# Patient Record
Sex: FEMALE | Race: ASIAN | NOT HISPANIC OR LATINO | URBAN - METROPOLITAN AREA
[De-identification: names, ages, dates, MRNs, and addresses within clinical notes are randomized per-mention and may not be internally consistent; named-entity substitution may affect disease eponyms.]

---

## 2024-04-12 ENCOUNTER — INPATIENT (INPATIENT)
Facility: HOSPITAL | Age: 69
LOS: 0 days | Discharge: HOME | DRG: 149 | End: 2024-04-13
Attending: STUDENT IN AN ORGANIZED HEALTH CARE EDUCATION/TRAINING PROGRAM | Admitting: STUDENT IN AN ORGANIZED HEALTH CARE EDUCATION/TRAINING PROGRAM
Payer: MEDICARE

## 2024-04-12 VITALS
RESPIRATION RATE: 20 BRPM | TEMPERATURE: 98 F | SYSTOLIC BLOOD PRESSURE: 152 MMHG | DIASTOLIC BLOOD PRESSURE: 82 MMHG | HEART RATE: 95 BPM | OXYGEN SATURATION: 96 %

## 2024-04-12 LAB
ALBUMIN SERPL ELPH-MCNC: 4.3 G/DL — SIGNIFICANT CHANGE UP (ref 3.3–5)
ALP SERPL-CCNC: 81 U/L — SIGNIFICANT CHANGE UP (ref 40–120)
ALT FLD-CCNC: 12 U/L — SIGNIFICANT CHANGE UP (ref 10–45)
ANION GAP SERPL CALC-SCNC: 14 MMOL/L — SIGNIFICANT CHANGE UP (ref 5–17)
AST SERPL-CCNC: 20 U/L — SIGNIFICANT CHANGE UP (ref 10–40)
B-OH-BUTYR SERPL-SCNC: 0.1 MMOL/L — SIGNIFICANT CHANGE UP
BASE EXCESS BLDV CALC-SCNC: 0.5 MMOL/L — SIGNIFICANT CHANGE UP (ref -2–3)
BASOPHILS # BLD AUTO: 0.03 K/UL — SIGNIFICANT CHANGE UP (ref 0–0.2)
BASOPHILS NFR BLD AUTO: 0.5 % — SIGNIFICANT CHANGE UP (ref 0–2)
BILIRUB SERPL-MCNC: 0.4 MG/DL — SIGNIFICANT CHANGE UP (ref 0.2–1.2)
BUN SERPL-MCNC: 27 MG/DL — HIGH (ref 7–23)
CA-I SERPL-SCNC: 1.2 MMOL/L — SIGNIFICANT CHANGE UP (ref 1.15–1.33)
CALCIUM SERPL-MCNC: 9.5 MG/DL — SIGNIFICANT CHANGE UP (ref 8.4–10.5)
CHLORIDE BLDV-SCNC: 102 MMOL/L — SIGNIFICANT CHANGE UP (ref 96–108)
CHLORIDE SERPL-SCNC: 102 MMOL/L — SIGNIFICANT CHANGE UP (ref 96–108)
CO2 BLDV-SCNC: 27 MMOL/L — HIGH (ref 22–26)
CO2 SERPL-SCNC: 22 MMOL/L — SIGNIFICANT CHANGE UP (ref 22–31)
CREAT SERPL-MCNC: 0.66 MG/DL — SIGNIFICANT CHANGE UP (ref 0.5–1.3)
EGFR: 95 ML/MIN/1.73M2 — SIGNIFICANT CHANGE UP
EOSINOPHIL # BLD AUTO: 0.06 K/UL — SIGNIFICANT CHANGE UP (ref 0–0.5)
EOSINOPHIL NFR BLD AUTO: 1 % — SIGNIFICANT CHANGE UP (ref 0–6)
GAS PNL BLDV: 135 MMOL/L — LOW (ref 136–145)
GAS PNL BLDV: SIGNIFICANT CHANGE UP
GLUCOSE BLDV-MCNC: 241 MG/DL — HIGH (ref 70–99)
GLUCOSE SERPL-MCNC: 244 MG/DL — HIGH (ref 70–99)
HCO3 BLDV-SCNC: 26 MMOL/L — SIGNIFICANT CHANGE UP (ref 22–29)
HCT VFR BLD CALC: 35.9 % — SIGNIFICANT CHANGE UP (ref 34.5–45)
HCT VFR BLDA CALC: 39 % — SIGNIFICANT CHANGE UP (ref 34.5–46.5)
HGB BLD CALC-MCNC: 12 G/DL — SIGNIFICANT CHANGE UP (ref 11.7–16.1)
HGB BLD-MCNC: 12.1 G/DL — SIGNIFICANT CHANGE UP (ref 11.5–15.5)
IMM GRANULOCYTES NFR BLD AUTO: 0.8 % — SIGNIFICANT CHANGE UP (ref 0–0.9)
LACTATE BLDV-MCNC: 2.3 MMOL/L — HIGH (ref 0.5–2)
LYMPHOCYTES # BLD AUTO: 1.45 K/UL — SIGNIFICANT CHANGE UP (ref 1–3.3)
LYMPHOCYTES # BLD AUTO: 23 % — SIGNIFICANT CHANGE UP (ref 13–44)
MAGNESIUM SERPL-MCNC: 1.7 MG/DL — SIGNIFICANT CHANGE UP (ref 1.6–2.6)
MCHC RBC-ENTMCNC: 31.5 PG — SIGNIFICANT CHANGE UP (ref 27–34)
MCHC RBC-ENTMCNC: 33.7 GM/DL — SIGNIFICANT CHANGE UP (ref 32–36)
MCV RBC AUTO: 93.5 FL — SIGNIFICANT CHANGE UP (ref 80–100)
MONOCYTES # BLD AUTO: 0.49 K/UL — SIGNIFICANT CHANGE UP (ref 0–0.9)
MONOCYTES NFR BLD AUTO: 7.8 % — SIGNIFICANT CHANGE UP (ref 2–14)
NEUTROPHILS # BLD AUTO: 4.22 K/UL — SIGNIFICANT CHANGE UP (ref 1.8–7.4)
NEUTROPHILS NFR BLD AUTO: 66.9 % — SIGNIFICANT CHANGE UP (ref 43–77)
NRBC # BLD: 0 /100 WBCS — SIGNIFICANT CHANGE UP (ref 0–0)
OTHER CELLS CSF MANUAL: 14.2 ML/DL — LOW (ref 18–22)
PCO2 BLDV: 44 MMHG — HIGH (ref 39–42)
PH BLDV: 7.38 — SIGNIFICANT CHANGE UP (ref 7.32–7.43)
PHOSPHATE SERPL-MCNC: 2.3 MG/DL — LOW (ref 2.5–4.5)
PLATELET # BLD AUTO: 220 K/UL — SIGNIFICANT CHANGE UP (ref 150–400)
PO2 BLDV: 52 MMHG — HIGH (ref 25–45)
POTASSIUM BLDV-SCNC: 4.1 MMOL/L — SIGNIFICANT CHANGE UP (ref 3.5–5.1)
POTASSIUM SERPL-MCNC: 3.9 MMOL/L — SIGNIFICANT CHANGE UP (ref 3.5–5.3)
POTASSIUM SERPL-SCNC: 3.9 MMOL/L — SIGNIFICANT CHANGE UP (ref 3.5–5.3)
PROT SERPL-MCNC: 7.3 G/DL — SIGNIFICANT CHANGE UP (ref 6–8.3)
RBC # BLD: 3.84 M/UL — SIGNIFICANT CHANGE UP (ref 3.8–5.2)
RBC # FLD: 13.5 % — SIGNIFICANT CHANGE UP (ref 10.3–14.5)
SAO2 % BLDV: 86 % — SIGNIFICANT CHANGE UP (ref 67–88)
SODIUM SERPL-SCNC: 138 MMOL/L — SIGNIFICANT CHANGE UP (ref 135–145)
WBC # BLD: 6.3 K/UL — SIGNIFICANT CHANGE UP (ref 3.8–10.5)
WBC # FLD AUTO: 6.3 K/UL — SIGNIFICANT CHANGE UP (ref 3.8–10.5)

## 2024-04-12 PROCEDURE — 70498 CT ANGIOGRAPHY NECK: CPT | Mod: 26,MC

## 2024-04-12 PROCEDURE — 99285 EMERGENCY DEPT VISIT HI MDM: CPT

## 2024-04-12 PROCEDURE — 70496 CT ANGIOGRAPHY HEAD: CPT | Mod: 26,MC

## 2024-04-12 RX ORDER — MECLIZINE HCL 12.5 MG
25 TABLET ORAL ONCE
Refills: 0 | Status: COMPLETED | OUTPATIENT
Start: 2024-04-12 | End: 2024-04-12

## 2024-04-12 RX ORDER — SODIUM CHLORIDE 9 MG/ML
1000 INJECTION, SOLUTION INTRAVENOUS ONCE
Refills: 0 | Status: COMPLETED | OUTPATIENT
Start: 2024-04-12 | End: 2024-04-12

## 2024-04-12 RX ADMIN — SODIUM CHLORIDE 1000 MILLILITER(S): 9 INJECTION, SOLUTION INTRAVENOUS at 23:41

## 2024-04-12 RX ADMIN — Medication 25 MILLIGRAM(S): at 23:41

## 2024-04-12 NOTE — ED PROVIDER NOTE - ATTENDING CONTRIBUTION TO CARE
Private Physician Zachery Crain pcp  68y f PMH DMT2 on metformin. HTN, HLD, PT comes to ed c/o feeling weak/faint at 645p w room spinning. Nausea w/o vomiting. worse w movement. No prior hx similar event. Had uri last week w cough and runny nose. Private Physician Zachery Crain flucorie pcp  68y f PMH DMT2 on metformin. HTN, HLD, PT comes to ed c/o feeling weak/faint at 645p w room spinning. Nausea w/o vomiting. worse w movement. No prior hx similar event. Had uri last week w cough and runny nose. PE WDWN female awake alert normocephalic atraumatic neck supple chest clear anterior & posterior chest clear anterior & posterior cv no rubs, gallops or murmurs abd soft +bs no mass guarding rebound. neruo gcs 15 speech fluent power 5/5 all ext, cn2-12 intact,   Sterling Davis MD, Facep

## 2024-04-12 NOTE — ED PROVIDER NOTE - NS ED ROS FT
Constitutional/General: ( ) Acute distress   Eyes: ( ) Changes in vision, ( ) double vision, ( ) blurry vision  ENT: ( ) Nasal congestion or rhinorrhea, ( ) changes in hearing, ( ) odynophagia or dysphagia   Skin: ( ) Rashes  Cardiovascular: ( ) Chest pain, ( ) heart palpitations, ( ) orthopnea/PND  Pulmonary: ( ) Shortness of breath, ( ) cough, ( ) pleuritic chest pain, ( ) hemoptysis   Gastrointestinal: (YES) Nausea, ( ) vomiting, ( ) diarrhea, ( ) bloating, ( ) constipation, ( ) abdominal pain  Genitourinary: ( ) Dysuria, ( ) frequency, ( ) change in urine odor/appearance   Musculoskeletal: ( ) Changes in strength, ( ) joint tenderness or swelling  Neurologic: ( ) Changes in memory, ( ) headache, ( ) weakness, ( ) paresthesias, ( ) imbalance, (YES) dizziness   Endocrine: ( ) Heat/cold intolerance, ( ) weight change, ( ) excessive sweating, ( ) polydipsia/polyuria  Psychology: ( ) Changes in mood, ( ) anxiety, ( ) depression.  Heme/Lymph: ( ) Easy bruising    ROS otherwise negative unless mentioned in HPI

## 2024-04-12 NOTE — ED PROVIDER NOTE - PHYSICAL EXAMINATION
CONSTITUTIONAL: Somnolent  HEENT: Moist oral mucosa  RESPIRATORY: Normal respiratory effort, lungs are clear to auscultation bilaterally  CARDIOVASCULAR: Tachycardic rate and regular rhythm, normal S1 and S2, no murmur/rub/gallop, no lower extremity edema, peripheral pulses are palpable bilaterally  ABDOMEN: Soft, nondistended, nontender to palpation, normoactive bowel sounds, no rebound/guarding  PSYCH: A+O to person, place, and time. Opens eyes to voice  NEUROLOGY: Facial expression symmetric, no gross sensory deficits appreciated, moves all extremities spontaneously. Patient slow to respond to commands and does them slowly. No nystagmus able to be endorsed. Dizziness worsened with head movements  SKIN: No rashes, no palpable lesions

## 2024-04-12 NOTE — ED PROVIDER NOTE - CLINICAL SUMMARY MEDICAL DECISION MAKING FREE TEXT BOX
68 year old Cantonese speaking woman presenting to the ED with dizziness and lethargy since 6PM. Acute onset of dizziness and lethargy per this evening per daughter who lives with her. Otherwise normal this morning. Likely viral URI last week, could possibly have caused labyrinthitis leading to vertigo given room spinning endorsement. Patient overall somnolent, slow to respond and follow commands. Neuro exam overall intact and non-focal. Could be euglycemic DKA as well. Basic labs, BHB, VBG with lactate, fluids, CTA head and neck for evaluation. 68 year old Cantonese speaking woman presenting to the ED with dizziness and lethargy since 6PM. Acute onset of dizziness and lethargy per this evening per daughter who lives with her. Otherwise normal this morning. Likely viral URI last week, could possibly have caused labyrinthitis leading to vertigo given room spinning endorsement, also possible for BPPV. Patient overall somnolent, slow to respond and follow commands. Neuro exam overall intact and otherwise non-focal. Could be euglycemic DKA as well. Basic labs, BHB, VBG with lactate, fluids, CTA head and neck for evaluation. 68 year old Cantonese speaking woman presenting to the ED with dizziness and lethargy since 6PM. Acute onset of dizziness and lethargy per this evening per daughter who lives with her. Otherwise normal this morning. Likely viral URI last week, could possibly have caused labyrinthitis leading to vertigo given room spinning endorsement, also possible for BPPV. Patient overall somnolent, slow to respond and follow commands. Neuro exam overall intact and otherwise non-focal. Could be euglycemic DKA as well. Basic labs, BHB, VBG with lactate, fluids, blood and urine cultures, CTA head and neck for evaluation. 68 year old Cantonese speaking woman presenting to the ED with dizziness and lethargy since 6PM. Acute onset of dizziness and lethargy per this evening per daughter who lives with her. Otherwise normal this morning. Likely viral URI last week, could possibly have caused labyrinthitis leading to vertigo given room spinning endorsement, also possible for BPPV. Patient overall somnolent, slow to respond and follow commands. Neuro exam overall intact and otherwise non-focal. Could be euglycemic DKA as well. Afebrile, not hypotensive, doesn't appear to be septic presentation. Basic labs, BHB, VBG with lactate, fluids, blood and urine cultures, CTA head and neck for evaluation.

## 2024-04-12 NOTE — ED PROVIDER NOTE - PROGRESS NOTE DETAILS
Endorsed to Dr ROXANNE Davis MD, Facep receive s/o pt w/ dizziness and lethargy was on 2L NC, to maintain ox, attempted to wean off however while at rest sat to 90 percent while in bed, xr w/ L sided infiltrate as a result, will obtain ct scan, additionally, pt UA w/ possible UTI will cover w/ rocephin, pt to be admitted for hypoxia

## 2024-04-12 NOTE — ED PROVIDER NOTE - OBJECTIVE STATEMENT
68 year old Cantonese speaking woman presenting to the ED with dizziness and lethargy since 6PM. Per daughter at bedside who provides translation and collateral endorses that her mother was fine this morning and afternoon. Patient lives with daughter and daughter left the house at 3PM today. Patient then called daughter at 6PM saying 68 year old Cantonese speaking woman presenting to the ED with dizziness and lethargy since 6PM. Per daughter at bedside who provides translation and collateral endorses that her mother was fine this morning and afternoon. Patient lives with daughter and daughter left the house at 3PM today. Patient then called daughter at 6PM saying she was dizzy and felt weak. Daughter endorses that last week her mom had cough, congestion last week. Patient hasn't had symptoms like this before per daughter and patient. No chest pain, no shortness of breath. Endorsed nausea but no vomiting. No lower extremity swelling. Endorses room spinning sensation.

## 2024-04-13 VITALS
OXYGEN SATURATION: 96 % | RESPIRATION RATE: 18 BRPM | SYSTOLIC BLOOD PRESSURE: 126 MMHG | TEMPERATURE: 98 F | DIASTOLIC BLOOD PRESSURE: 71 MMHG | HEART RATE: 70 BPM

## 2024-04-13 DIAGNOSIS — Z98.891 HISTORY OF UTERINE SCAR FROM PREVIOUS SURGERY: Chronic | ICD-10-CM

## 2024-04-13 DIAGNOSIS — E11.9 TYPE 2 DIABETES MELLITUS WITHOUT COMPLICATIONS: ICD-10-CM

## 2024-04-13 DIAGNOSIS — J98.11 ATELECTASIS: ICD-10-CM

## 2024-04-13 DIAGNOSIS — I10 ESSENTIAL (PRIMARY) HYPERTENSION: ICD-10-CM

## 2024-04-13 DIAGNOSIS — R42 DIZZINESS AND GIDDINESS: ICD-10-CM

## 2024-04-13 LAB
ADD ON TEST-SPECIMEN IN LAB: SIGNIFICANT CHANGE UP
APPEARANCE UR: ABNORMAL
APTT BLD: 28.2 SEC — SIGNIFICANT CHANGE UP (ref 24.5–35.6)
BACTERIA # UR AUTO: ABNORMAL /HPF
BASE EXCESS BLDV CALC-SCNC: 1.5 MMOL/L — SIGNIFICANT CHANGE UP (ref -2–3)
BILIRUB UR-MCNC: NEGATIVE — SIGNIFICANT CHANGE UP
CA-I SERPL-SCNC: 1.21 MMOL/L — SIGNIFICANT CHANGE UP (ref 1.15–1.33)
CAST: 0 /LPF — SIGNIFICANT CHANGE UP (ref 0–4)
CHLORIDE BLDV-SCNC: 104 MMOL/L — SIGNIFICANT CHANGE UP (ref 96–108)
CO2 BLDV-SCNC: 27 MMOL/L — HIGH (ref 22–26)
COD CRY URNS QL: PRESENT
COLOR SPEC: YELLOW — SIGNIFICANT CHANGE UP
D DIMER BLD IA.RAPID-MCNC: <150 NG/ML DDU — SIGNIFICANT CHANGE UP
DIFF PNL FLD: NEGATIVE — SIGNIFICANT CHANGE UP
FLUAV AG NPH QL: SIGNIFICANT CHANGE UP
FLUBV AG NPH QL: SIGNIFICANT CHANGE UP
GAS PNL BLDV: 136 MMOL/L — SIGNIFICANT CHANGE UP (ref 136–145)
GAS PNL BLDV: SIGNIFICANT CHANGE UP
GLUCOSE BLDV-MCNC: 163 MG/DL — HIGH (ref 70–99)
GLUCOSE UR QL: >=1000 MG/DL
HCO3 BLDV-SCNC: 26 MMOL/L — SIGNIFICANT CHANGE UP (ref 22–29)
HCT VFR BLDA CALC: 35 % — SIGNIFICANT CHANGE UP (ref 34.5–46.5)
HGB BLD CALC-MCNC: 11.5 G/DL — LOW (ref 11.7–16.1)
INR BLD: 1.03 RATIO — SIGNIFICANT CHANGE UP (ref 0.85–1.18)
KETONES UR-MCNC: NEGATIVE MG/DL — SIGNIFICANT CHANGE UP
LACTATE BLDV-MCNC: 1.4 MMOL/L — SIGNIFICANT CHANGE UP (ref 0.5–2)
LEUKOCYTE ESTERASE UR-ACNC: ABNORMAL
NITRITE UR-MCNC: NEGATIVE — SIGNIFICANT CHANGE UP
PCO2 BLDV: 39 MMHG — SIGNIFICANT CHANGE UP (ref 39–42)
PH BLDV: 7.43 — SIGNIFICANT CHANGE UP (ref 7.32–7.43)
PH UR: 6 — SIGNIFICANT CHANGE UP (ref 5–8)
PO2 BLDV: 62 MMHG — HIGH (ref 25–45)
POTASSIUM BLDV-SCNC: 3.4 MMOL/L — LOW (ref 3.5–5.1)
PROT UR-MCNC: NEGATIVE MG/DL — SIGNIFICANT CHANGE UP
PROTHROM AB SERPL-ACNC: 10.8 SEC — SIGNIFICANT CHANGE UP (ref 9.5–13)
RBC CASTS # UR COMP ASSIST: 1 /HPF — SIGNIFICANT CHANGE UP (ref 0–4)
REVIEW: SIGNIFICANT CHANGE UP
RSV RNA NPH QL NAA+NON-PROBE: SIGNIFICANT CHANGE UP
SAO2 % BLDV: 93.9 % — HIGH (ref 67–88)
SARS-COV-2 RNA SPEC QL NAA+PROBE: SIGNIFICANT CHANGE UP
SP GR SPEC: >1.03 — HIGH (ref 1–1.03)
SQUAMOUS # UR AUTO: 8 /HPF — HIGH (ref 0–5)
TROPONIN T, HIGH SENSITIVITY RESULT: <6 NG/L — SIGNIFICANT CHANGE UP (ref 0–51)
UROBILINOGEN FLD QL: 0.2 MG/DL — SIGNIFICANT CHANGE UP (ref 0.2–1)
WBC UR QL: 76 /HPF — HIGH (ref 0–5)

## 2024-04-13 PROCEDURE — 82803 BLOOD GASES ANY COMBINATION: CPT

## 2024-04-13 PROCEDURE — 87637 SARSCOV2&INF A&B&RSV AMP PRB: CPT

## 2024-04-13 PROCEDURE — 99223 1ST HOSP IP/OBS HIGH 75: CPT

## 2024-04-13 PROCEDURE — 87040 BLOOD CULTURE FOR BACTERIA: CPT

## 2024-04-13 PROCEDURE — 71250 CT THORAX DX C-: CPT | Mod: 26,MC

## 2024-04-13 PROCEDURE — 80053 COMPREHEN METABOLIC PANEL: CPT

## 2024-04-13 PROCEDURE — 84295 ASSAY OF SERUM SODIUM: CPT

## 2024-04-13 PROCEDURE — 71045 X-RAY EXAM CHEST 1 VIEW: CPT

## 2024-04-13 PROCEDURE — 81001 URINALYSIS AUTO W/SCOPE: CPT

## 2024-04-13 PROCEDURE — 71045 X-RAY EXAM CHEST 1 VIEW: CPT | Mod: 26

## 2024-04-13 PROCEDURE — 70496 CT ANGIOGRAPHY HEAD: CPT | Mod: MC

## 2024-04-13 PROCEDURE — 85014 HEMATOCRIT: CPT

## 2024-04-13 PROCEDURE — 96374 THER/PROPH/DIAG INJ IV PUSH: CPT

## 2024-04-13 PROCEDURE — 82010 KETONE BODYS QUAN: CPT

## 2024-04-13 PROCEDURE — 84484 ASSAY OF TROPONIN QUANT: CPT

## 2024-04-13 PROCEDURE — 71250 CT THORAX DX C-: CPT | Mod: MC

## 2024-04-13 PROCEDURE — 83605 ASSAY OF LACTIC ACID: CPT

## 2024-04-13 PROCEDURE — 85610 PROTHROMBIN TIME: CPT

## 2024-04-13 PROCEDURE — 83880 ASSAY OF NATRIURETIC PEPTIDE: CPT

## 2024-04-13 PROCEDURE — 85025 COMPLETE CBC W/AUTO DIFF WBC: CPT

## 2024-04-13 PROCEDURE — 87086 URINE CULTURE/COLONY COUNT: CPT

## 2024-04-13 PROCEDURE — 84132 ASSAY OF SERUM POTASSIUM: CPT

## 2024-04-13 PROCEDURE — 83735 ASSAY OF MAGNESIUM: CPT

## 2024-04-13 PROCEDURE — 70498 CT ANGIOGRAPHY NECK: CPT | Mod: MC

## 2024-04-13 PROCEDURE — 85018 HEMOGLOBIN: CPT

## 2024-04-13 PROCEDURE — 85379 FIBRIN DEGRADATION QUANT: CPT

## 2024-04-13 PROCEDURE — 82330 ASSAY OF CALCIUM: CPT

## 2024-04-13 PROCEDURE — 99285 EMERGENCY DEPT VISIT HI MDM: CPT

## 2024-04-13 PROCEDURE — 82947 ASSAY GLUCOSE BLOOD QUANT: CPT

## 2024-04-13 PROCEDURE — 85730 THROMBOPLASTIN TIME PARTIAL: CPT

## 2024-04-13 PROCEDURE — 84100 ASSAY OF PHOSPHORUS: CPT

## 2024-04-13 PROCEDURE — 82435 ASSAY OF BLOOD CHLORIDE: CPT

## 2024-04-13 RX ORDER — PIOGLITAZONE HYDROCHLORIDE 15 MG/1
1 TABLET ORAL
Refills: 0 | DISCHARGE

## 2024-04-13 RX ORDER — METFORMIN HYDROCHLORIDE 850 MG/1
1 TABLET ORAL
Refills: 0 | DISCHARGE

## 2024-04-13 RX ORDER — LOSARTAN POTASSIUM 100 MG/1
25 TABLET, FILM COATED ORAL DAILY
Refills: 0 | Status: DISCONTINUED | OUTPATIENT
Start: 2024-04-13 | End: 2024-04-13

## 2024-04-13 RX ORDER — FOLIC ACID 0.8 MG
1 TABLET ORAL DAILY
Refills: 0 | Status: DISCONTINUED | OUTPATIENT
Start: 2024-04-13 | End: 2024-04-13

## 2024-04-13 RX ORDER — CHOLECALCIFEROL (VITAMIN D3) 125 MCG
1000 CAPSULE ORAL DAILY
Refills: 0 | Status: DISCONTINUED | OUTPATIENT
Start: 2024-04-13 | End: 2024-04-13

## 2024-04-13 RX ORDER — LANOLIN ALCOHOL/MO/W.PET/CERES
3 CREAM (GRAM) TOPICAL AT BEDTIME
Refills: 0 | Status: DISCONTINUED | OUTPATIENT
Start: 2024-04-13 | End: 2024-04-13

## 2024-04-13 RX ORDER — LOSARTAN POTASSIUM 100 MG/1
1 TABLET, FILM COATED ORAL
Refills: 0 | DISCHARGE

## 2024-04-13 RX ORDER — FOLIC ACID 0.8 MG
1 TABLET ORAL
Refills: 0 | DISCHARGE

## 2024-04-13 RX ORDER — CEFTRIAXONE 500 MG/1
1000 INJECTION, POWDER, FOR SOLUTION INTRAMUSCULAR; INTRAVENOUS ONCE
Refills: 0 | Status: COMPLETED | OUTPATIENT
Start: 2024-04-13 | End: 2024-04-13

## 2024-04-13 RX ORDER — ATORVASTATIN CALCIUM 80 MG/1
10 TABLET, FILM COATED ORAL AT BEDTIME
Refills: 0 | Status: DISCONTINUED | OUTPATIENT
Start: 2024-04-13 | End: 2024-04-13

## 2024-04-13 RX ORDER — CHOLECALCIFEROL (VITAMIN D3) 125 MCG
1 CAPSULE ORAL
Refills: 0 | DISCHARGE

## 2024-04-13 RX ORDER — ATORVASTATIN CALCIUM 80 MG/1
1 TABLET, FILM COATED ORAL
Refills: 0 | DISCHARGE

## 2024-04-13 RX ORDER — ACETAMINOPHEN 500 MG
650 TABLET ORAL EVERY 6 HOURS
Refills: 0 | Status: DISCONTINUED | OUTPATIENT
Start: 2024-04-13 | End: 2024-04-13

## 2024-04-13 RX ADMIN — Medication 1000 UNIT(S): at 11:38

## 2024-04-13 RX ADMIN — CEFTRIAXONE 100 MILLIGRAM(S): 500 INJECTION, POWDER, FOR SOLUTION INTRAMUSCULAR; INTRAVENOUS at 05:15

## 2024-04-13 RX ADMIN — Medication 1 MILLIGRAM(S): at 11:38

## 2024-04-13 RX ADMIN — LOSARTAN POTASSIUM 25 MILLIGRAM(S): 100 TABLET, FILM COATED ORAL at 11:38

## 2024-04-13 NOTE — DISCHARGE NOTE NURSING/CASE MANAGEMENT/SOCIAL WORK - NSDCPEFALRISK_GEN_ALL_CORE
For information on Fall & Injury Prevention, visit: [email or web address] OR  [email or web address] OR  https://www.cdc.gov/steadi/patient.html

## 2024-04-13 NOTE — H&P ADULT - NSHPPHYSICALEXAM_GEN_ALL_CORE
Vital Signs Last 24 Hrs  T(C): 36.4 (13 Apr 2024 08:00), Max: 36.9 (13 Apr 2024 05:55)  T(F): 97.6 (13 Apr 2024 08:00), Max: 98.4 (13 Apr 2024 05:55)  HR: 65 (13 Apr 2024 08:00) (59 - 95)  BP: 132/74 (13 Apr 2024 08:00) (115/65 - 152/82)  BP(mean): 75 (13 Apr 2024 05:55) (75 - 80)  RR: 18 (13 Apr 2024 08:00) (15 - 20)  SpO2: 97% (13 Apr 2024 08:00) (89% - 99%)    Parameters below as of 13 Apr 2024 08:00  Patient On (Oxygen Delivery Method): nasal cannula  O2 Flow (L/min): 2    93% on Room air w/ ambulation     GENERAL: NAD, well-developed  HEAD:  Atraumatic, Normocephalic  EYES: EOMI, PERRL, conjunctiva and sclera clear  NECK: Supple, No JVD  CHEST/LUNG: bibasilar crackles; otherwise clear to auscultation bilaterally; No wheeze  HEART: Regular rate and rhythm; No murmurs, rubs, or gallops  ABDOMEN: Soft, Nontender, Nondistended; Bowel sounds present  EXTREMITIES:  2+ Peripheral Pulses, No clubbing, cyanosis, or edema  PSYCH: AAOx3, appropriate affect  NEUROLOGY: non-focal, krishna  SKIN: No rashes or lesions

## 2024-04-13 NOTE — DISCHARGE NOTE PROVIDER - CARE PROVIDER_API CALL
Zachery Crain  Internal Medicine  [address] [place] [address]  Phone: [phone]  Fax: [phone]  Established Patient  Follow Up Time: 1 week

## 2024-04-13 NOTE — ED ADULT NURSE NOTE - OBJECTIVE STATEMENT
Pt is a 69yo F w/ PMH DM (Cantonese speaking , daughter translating at bedside) presenting to the ED with dizziness and lethargy since 6PM. Daughter states that her mother was fine this morning and afternoon. Patient lives with daughter and daughter left the house at 3PM today. Patient then called daughter at 6PM saying she was dizzy and felt weak. Daughter endorses that last week her mom had cough, congestion last week. Patient hasn't had symptoms like this before per daughter and patient. A&Ox4. Strong peripheral pulses. Neurologically intact and follows commands. Abdomen soft, nondistended, nontender to palpation. Skin warm dry intact and normal for ethnicity. Stretcher locked and in lowest position, appropriate side rails up. Pt instructed to notify RN if assistance is needed.

## 2024-04-13 NOTE — DISCHARGE NOTE PROVIDER - NSDCCPCAREPLAN_GEN_ALL_CORE_FT
PRINCIPAL DISCHARGE DIAGNOSIS  Diagnosis: Dizziness  Assessment and Plan of Treatment: You had an episode of dizziness which we did not identify a specific cause. This may be a residual effect of your recent viral illness. If this symptom recurs, you may follow-up with an ENT specialist.      SECONDARY DISCHARGE DIAGNOSES  Diagnosis: Atelectasis  Assessment and Plan of Treatment: You had borderline low oxygen levels. This is most likely the result of atelectasis, or incomplete expansion of your lungs. Take deep breaths and use your incentive spirometer to help open your airways. Regular exercise can also help improve your lung function.    Diagnosis: T2DM (type 2 diabetes mellitus)  Assessment and Plan of Treatment: Follow-up with your PCP for further diabetes management.    Diagnosis: Multinodular goiter  Assessment and Plan of Treatment: CT scan showed nodules in your thyroid. Follow-up with your PCP for a thyroid ultrasound.

## 2024-04-13 NOTE — DISCHARGE NOTE PROVIDER - HOSPITAL COURSE
HPI:  68F with hx of HTN, DM had sudden onset dizziness that started yesterday evening right before she was about to eat dinner. This has never happened to her before. Her dizziness caused her to not be able to eat dinner, and she called her daughter to take her to the emergency room. Daughter called EMS and she was transported to the hospital. Head movement made the symptoms worse. She arrived in the ED on 8L NRB, which was later de-escalated to 2L NC. At rest, her O2 dropped to 90%. Last week, pt had URI symptoms which are now resolved. Pt slept well in the ED. This AM, her symptoms are resolved and ambulated to the bathroom without any dizziness.     Initial vitals in the ED: /82, HR 95, RR 20, SaO2 96% on 8L NRB, T38.2. Pt received CTX 1g, LR 1L, meclizine 25 mg. ED treated for UTI. Pt is asymptomatic and does not have a UTI.  (13 Apr 2024 10:18)    Hospital Course: Pt discharged home.     Important Medication Changes and Reason:    Active or Pending Issues Requiring Follow-up: Follow-up with PCP for US of thyroid.     Advanced Directives:   [X] Full code  [ ] DNR  [ ] Hospice    Discharge Diagnoses:  Dizziness  Atelectasis  Multinodular goiter  HTN  DM

## 2024-04-13 NOTE — DISCHARGE NOTE NURSING/CASE MANAGEMENT/SOCIAL WORK - PATIENT PORTAL LINK FT
You can access the FollowMyHealth Patient Portal offered by [facility] by registering at the following website: [email or web address]. By joining [facility]’s FollowMyHealth portal, you will also be able to view your health information using other applications (apps) compatible with our system.

## 2024-04-13 NOTE — H&P ADULT - HISTORY OF PRESENT ILLNESS
68F with hx of HTN, DM had sudden onset dizziness that started yesterday evening right before she was about to eat dinner. This has never happened to her before. Her dizziness caused her to not be able to eat dinner, and she called her daughter to take her to the emergency room. Daughter called EMS and she was transported to the hospital. Head movement made the symptoms worse. She arrived in the ED on 8L NRB, which was later de-escalated to 2L NC. At rest, her O2 dropped to 90%. Last week, pt had URI symptoms which are now resolved. Pt slept well in the ED. This AM, her symptoms are resolved and ambulated to the bathroom without any dizziness.     Initial vitals in the ED: /82, HR 95, RR 20, SaO2 96% on 8L NRB, T38.2. Pt received CTX 1g, LR 1L, meclizine 25 mg.  68F with hx of HTN, DM had sudden onset dizziness that started yesterday evening right before she was about to eat dinner. This has never happened to her before. Her dizziness caused her to not be able to eat dinner, and she called her daughter to take her to the emergency room. Daughter called EMS and she was transported to the hospital. Head movement made the symptoms worse. She arrived in the ED on 8L NRB, which was later de-escalated to 2L NC. At rest, her O2 dropped to 90%. Last week, pt had URI symptoms which are now resolved. Pt slept well in the ED. This AM, her symptoms are resolved and ambulated to the bathroom without any dizziness.     Initial vitals in the ED: /82, HR 95, RR 20, SaO2 96% on 8L NRB, T38.2. Pt received CTX 1g, LR 1L, meclizine 25 mg. ED treated for UTI. Pt is asymptomatic and does not have a UTI.  68F with hx of HTN, DM had sudden onset dizziness that started yesterday evening right before she was about to eat dinner. This has never happened to her before. Her dizziness caused her to not be able to eat dinner, and she called her daughter to take her to the emergency room. Daughter called EMS and she was transported to the hospital. Head movement made the symptoms worse. She arrived in the ED on 8L NRB, which was later de-escalated to 2L NC. At rest, her O2 dropped to 90%. She also was significantly lethargic per ED documentation. Last week, pt had URI symptoms which are now resolved. Pt slept well in the ED. This AM, her symptoms are resolved and ambulated to the bathroom without any dizziness.     Initial vitals in the ED: /82, HR 95, RR 20, SaO2 96% on 8L NRB, T38.2. Pt received CTX 1g, LR 1L, meclizine 25 mg. ED treated for UTI. Pt is asymptomatic and does not have a UTI.

## 2024-04-13 NOTE — H&P ADULT - NSHPLABSRESULTS_GEN_ALL_CORE
12.1   6.30  )-----------( 220      ( 12 Apr 2024 21:35 )             35.9     04-12    138  |  102  |  27<H>  ----------------------------<  244<H>  3.9   |  22  |  0.66    Ca    9.5      12 Apr 2024 21:35  Phos  2.3     04-12  Mg     1.7     04-12    TPro  7.3  /  Alb  4.3  /  TBili  0.4  /  DBili  x   /  AST  20  /  ALT  12  /  AlkPhos  81  04-12    PT/INR - ( 13 Apr 2024 02:09 )   PT: 10.8 sec;   INR: 1.03 ratio         PTT - ( 13 Apr 2024 02:09 )  PTT:28.2 sec      Venous: 04-13-24 @ 05:29 FiO2: -- Oxygen Sat% 93.9  Venous: 04-12-24 @ 21:35 FiO2: -- Oxygen Sat% 86.0    Urinalysis Basic - ( 13 Apr 2024 01:12 )    Color: Yellow / Appearance: Cloudy / SG: >1.030 / pH: x  Gluc: x / Ketone: Negative mg/dL  / Bili: Negative / Urobili: 0.2 mg/dL   Blood: x / Protein: Negative mg/dL / Nitrite: Negative   Leuk Esterase: Small / RBC: 1 /HPF / WBC 76 /HPF   Sq Epi: x / Non Sq Epi: 8 /HPF / Bacteria: Few /HPF          CAPILLARY BLOOD GLUCOSE

## 2024-04-13 NOTE — DISCHARGE NOTE PROVIDER - NSDCMRMEDTOKEN_GEN_ALL_CORE_FT
atorvastatin 10 mg oral tablet: 1 tab(s) orally once a day (at bedtime)  cholecalciferol 25 mcg (1000 intl units) oral capsule: 1 cap(s) orally once a day  folic acid 1 mg oral tablet: 1 tab(s) orally once a day  losartan 25 mg oral tablet: 1 tab(s) orally once a day  metFORMIN 1000 mg oral tablet: 1 tab(s) orally 2 times a day  pioglitazone 45 mg oral tablet: 1 tab(s) orally once a day

## 2024-04-13 NOTE — H&P ADULT - ASSESSMENT
68F with hx of HTN, DM had sudden onset dizziness, now resolved.     # Dizziness  -Unclear etiology, ddx includes BPPV, labyrinthitis, vestibular neuritis, etc. CTH without acute pathology  -Now resolved  -Can follow-up outpatient ENT if this recurs    # Atelectasis  - CT with atelectasis, likely cause of borderline hypoxemia  - no indication for supplemental O2    # Multinodular goiter  - outpatient US, discussed with Anila, patient's daughter    # DM  Home DM meds: metformin and pioglitazone  - BMP with glucose 244 and UA with glycosuria suggests poor DM control  - outpatient follow-up  - c/w statin    # Ppx  - DVT: improve 1  - Diet: DASH/TLC/CC. allergic to all fruit  - Dispo: discharge home    The necessity of the time spent during the encounter on this date of service was due to:   - Ordering, reviewing, and interpreting labs, testing, and imaging  - Independently obtaining a review of systems and performing a physical exam  - Reviewing prior hospitalization and where necessary, outpatient records  - Reviewing consultant recommendations/communicating with consultants  - Counselling and educating patient and family regarding interpretation of aforementioned items and plan of care    Time-based billing (NON-critical care). Total minutes spent: 76

## 2024-04-13 NOTE — ED ADULT NURSE REASSESSMENT NOTE - NS ED NURSE REASSESS COMMENT FT1
Received report from night KISHRO Simeon. Upon assessment, A&Ox4, denies chest pain, SOB, n/v. Vitals WNL. Resting comfortably in bed. Awaiting H&P.

## 2024-04-13 NOTE — H&P ADULT - NSHPREVIEWOFSYSTEMS_GEN_ALL_CORE
REVIEW OF SYSTEMS:  CONSTITUTIONAL: No fever, chills  EYES: No eye pain, visual changes  ENMT:  No difficulty hearing  NECK: No pain or stiffness  RESPIRATORY: No cough, wheezing, or shortness of breath  CARDIOVASCULAR: No chest pain, palpitations  GASTROINTESTINAL: +occasionally has constipation. No abdominal pain. No nausea, vomiting; No diarrhea  GENITOURINARY: No dysuria, frequency  NEUROLOGICAL: +dizziness, now resolved. No headaches, memory loss  SKIN: No itching, burning, rashes, or lesions   ENDOCRINE: No heat or cold intolerance; No hair loss  MUSCULOSKELETAL: No joint pain or swelling; No muscle, back, or extremity pain  HEME/LYMPH: No easy bruising, or bleeding gums  ALLERGY AND IMMUNOLOGIC: No hives or eczema

## 2024-04-14 LAB
CULTURE RESULTS: SIGNIFICANT CHANGE UP
SPECIMEN SOURCE: SIGNIFICANT CHANGE UP

## 2024-04-18 LAB
CULTURE RESULTS: SIGNIFICANT CHANGE UP
CULTURE RESULTS: SIGNIFICANT CHANGE UP
SPECIMEN SOURCE: SIGNIFICANT CHANGE UP
SPECIMEN SOURCE: SIGNIFICANT CHANGE UP